# Patient Record
Sex: FEMALE | Race: WHITE | URBAN - METROPOLITAN AREA
[De-identification: names, ages, dates, MRNs, and addresses within clinical notes are randomized per-mention and may not be internally consistent; named-entity substitution may affect disease eponyms.]

---
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
## 2020-07-04 ENCOUNTER — HOSPITAL ENCOUNTER (OUTPATIENT)
Facility: MEDICAL CENTER | Age: 20
End: 2020-07-04
Attending: INTERNAL MEDICINE
Payer: COMMERCIAL

## 2020-07-04 ENCOUNTER — OFFICE VISIT (OUTPATIENT)
Dept: URGENT CARE | Facility: CLINIC | Age: 20
End: 2020-07-04
Payer: COMMERCIAL

## 2020-07-04 VITALS
TEMPERATURE: 98.1 F | DIASTOLIC BLOOD PRESSURE: 64 MMHG | OXYGEN SATURATION: 99 % | HEART RATE: 82 BPM | SYSTOLIC BLOOD PRESSURE: 114 MMHG | WEIGHT: 268 LBS | HEIGHT: 70 IN | BODY MASS INDEX: 38.37 KG/M2

## 2020-07-04 DIAGNOSIS — B34.9 VIRAL INFECTION: ICD-10-CM

## 2020-07-04 DIAGNOSIS — R53.83 FATIGUE, UNSPECIFIED TYPE: ICD-10-CM

## 2020-07-04 DIAGNOSIS — R51.9 ACUTE NONINTRACTABLE HEADACHE, UNSPECIFIED HEADACHE TYPE: ICD-10-CM

## 2020-07-04 LAB
AMBIGUOUS DTTM AMBI4: NORMAL
COVID ORDER STATUS COVID19: NORMAL

## 2020-07-04 PROCEDURE — U0003 INFECTIOUS AGENT DETECTION BY NUCLEIC ACID (DNA OR RNA); SEVERE ACUTE RESPIRATORY SYNDROME CORONAVIRUS 2 (SARS-COV-2) (CORONAVIRUS DISEASE [COVID-19]), AMPLIFIED PROBE TECHNIQUE, MAKING USE OF HIGH THROUGHPUT TECHNOLOGIES AS DESCRIBED BY CMS-2020-01-R: HCPCS

## 2020-07-04 PROCEDURE — 99204 OFFICE O/P NEW MOD 45 MIN: CPT | Performed by: INTERNAL MEDICINE

## 2020-07-04 ASSESSMENT — ENCOUNTER SYMPTOMS
HEADACHES: 1
FATIGUE: 1
MYALGIAS: 1
FEVER: 0
SORE THROAT: 0
COUGH: 0
VOMITING: 0
SHORTNESS OF BREATH: 0
ARTHRALGIAS: 1
SWOLLEN GLANDS: 0

## 2020-07-04 NOTE — PROGRESS NOTES
Subjective:     Rebecca Garland is a 20 y.o. female who presents for Fatigue (x3days, joint pain, muscle fatigue, headache)       Fatigue   This is a new problem. The current episode started in the past 7 days. Associated symptoms include arthralgias, fatigue, headaches and myalgias. Pertinent negatives include no coughing, fever, sore throat, swollen glands or vomiting.   History reviewed. No pertinent past medical history.  Past Surgical History:   Procedure Laterality Date   • DENTAL EXTRACTION(S)  4/2/2014    Performed by Mg Carter D.D.S. at [facility]   • GYN SURGERY  2003    vaginal repair due to fall on [place] bars   • TONSILLECTOMY AND ADENOIDECTOMY       Social History     Socioeconomic History   • Marital status: Single     Spouse name: Not on file   • Number of children: Not on file   • Years of education: Not on file   • Highest education level: Not on file   Occupational History   • Not on file   Social Needs   • Financial resource strain: Not on file   • Food insecurity     Worry: Not on file     Inability: Not on file   • Transportation needs     Medical: Not on file     Non-medical: Not on file   Tobacco Use   • Smoking status: Never Smoker   • Smokeless tobacco: Never Used   Substance and Sexual Activity   • Alcohol use: No   • Drug use: No   • Sexual activity: Never     Comment: started menses 11-12  yrs old   Lifestyle   • Physical activity     Days per week: Not on file     Minutes per session: Not on file   • Stress: Not on file   Relationships   • Social connections     Talks on phone: Not on file     Gets together: Not on file     Attends Zoroastrian service: Not on file     Active member of club or organization: Not on file     Attends meetings of clubs or organizations: Not on file     Relationship status: Not on file   • Intimate partner violence     Fear of current or ex partner: Not on file     Emotionally abused: Not on file     Physically abused: Not on file      "Forced sexual activity: Not on file   Other Topics Concern   • Behavioral problems Not Asked   • Interpersonal relationships Not Asked   • Sad or not enjoying activities Not Asked   • Suicidal thoughts Not Asked   • Poor school performance Not Asked   • Reading difficulties Not Asked   • Speech difficulties Not Asked   • Writing difficulties Not Asked   • Inadequate sleep Not Asked   • Excessive TV viewing Not Asked   • Excessive video game use Not Asked   • Inadequate exercise Not Asked   • Sports related Not Asked   • Poor diet Not Asked   • Family concerns for drug/alcohol abuse Not Asked   • Poor oral hygiene Not Asked   • Bike safety Not Asked   • Family concerns vehicle safety Not Asked   Social History Narrative   • Not on file    History reviewed. No pertinent family history. Review of Systems   Constitutional: Positive for fatigue. Negative for fever.   HENT: Negative for sore throat.    Respiratory: Negative for cough and shortness of breath.    Gastrointestinal: Negative for vomiting.   Musculoskeletal: Positive for arthralgias and myalgias.   Neurological: Positive for headaches.   All other systems reviewed and are negative.  No Known Allergies   Objective:   /64 (BP Location: Left arm, Patient Position: Sitting, BP Cuff Size: Large adult)   Pulse 82   Temp 36.7 °C (98.1 °F) (Temporal)   Ht 1.778 m (5' 10\")   Wt 121.6 kg (268 lb)   SpO2 99%   BMI 38.45 kg/m²   Physical Exam  Constitutional:       General: She is not in acute distress.     Appearance: She is well-developed.   HENT:      Head: Normocephalic and atraumatic.      Mouth/Throat:      Mouth: Mucous membranes are moist.      Pharynx: Oropharynx is clear.   Eyes:      Conjunctiva/sclera: Conjunctivae normal.   Neck:      Musculoskeletal: No neck rigidity.   Cardiovascular:      Rate and Rhythm: Normal rate and regular rhythm.   Pulmonary:      Effort: Pulmonary effort is normal. No respiratory distress.      Breath sounds: Normal " breath sounds.   Lymphadenopathy:      Cervical: No cervical adenopathy.   Skin:     General: Skin is warm and dry.      Capillary Refill: Capillary refill takes less than 2 seconds.   Neurological:      Mental Status: She is alert and oriented to person, place, and time.      Sensory: No sensory deficit.      Deep Tendon Reflexes: Reflexes are normal and symmetric.   Psychiatric:         Mood and Affect: Mood normal.         Behavior: Behavior normal.           Assessment/Plan:   Assessment    1. Viral infection  - COVID/SARS CoV-2 PCR; Future    2. Fatigue, unspecified type    3. Acute nonintractable headache, unspecified headache type    covid neg      Advised Tylenol gram 3 times a day, ibuprofen 600mg 3-4 times a day with food as needed, counseled on indications to go to the hospital, increase fluid intake  Follow-up with PCP if is not better in a week    Differential diagnosis, natural history, supportive care, and indications for immediate follow-up discussed.

## 2020-07-05 LAB
SARS-COV-2 RNA RESP QL NAA+PROBE: NOTDETECTED
SPECIMEN SOURCE: NORMAL

## 2020-07-23 ENCOUNTER — TELEMEDICINE (OUTPATIENT)
Dept: MEDICAL GROUP | Facility: LAB | Age: 20
End: 2020-07-23
Payer: COMMERCIAL

## 2020-07-23 VITALS — HEIGHT: 70 IN | WEIGHT: 260 LBS | TEMPERATURE: 98.2 F | BODY MASS INDEX: 37.22 KG/M2

## 2020-07-23 DIAGNOSIS — Z30.41 ENCOUNTER FOR SURVEILLANCE OF CONTRACEPTIVE PILLS: ICD-10-CM

## 2020-07-23 DIAGNOSIS — R79.89 ABNORMAL TSH: ICD-10-CM

## 2020-07-23 DIAGNOSIS — F41.9 ANXIETY: ICD-10-CM

## 2020-07-23 DIAGNOSIS — E66.9 OBESITY WITHOUT SERIOUS COMORBIDITY, UNSPECIFIED CLASSIFICATION, UNSPECIFIED OBESITY TYPE: ICD-10-CM

## 2020-07-23 DIAGNOSIS — F32.89 OTHER DEPRESSION: ICD-10-CM

## 2020-07-23 PROCEDURE — 99214 OFFICE O/P EST MOD 30 MIN: CPT | Mod: 95,CR | Performed by: NURSE PRACTITIONER

## 2020-07-23 RX ORDER — BUPROPION HYDROCHLORIDE 150 MG/1
150 TABLET ORAL EVERY MORNING
Qty: 30 TAB | Refills: 5 | Status: SHIPPED | OUTPATIENT
Start: 2020-07-23 | End: 2020-11-23 | Stop reason: SDUPTHER

## 2020-07-23 ASSESSMENT — PATIENT HEALTH QUESTIONNAIRE - PHQ9
SUM OF ALL RESPONSES TO PHQ9 QUESTIONS 1 AND 2: 3
3. TROUBLE FALLING OR STAYING ASLEEP OR SLEEPING TOO MUCH: NEARLY EVERY DAY
2. FEELING DOWN, DEPRESSED, IRRITABLE, OR HOPELESS: SEVERAL DAYS
5. POOR APPETITE OR OVEREATING: MORE THAN HALF THE DAYS
8. MOVING OR SPEAKING SO SLOWLY THAT OTHER PEOPLE COULD HAVE NOTICED. OR THE OPPOSITE, BEING SO FIGETY OR RESTLESS THAT YOU HAVE BEEN MOVING AROUND A LOT MORE THAN USUAL: NOT AT ALL
9. THOUGHTS THAT YOU WOULD BE BETTER OFF DEAD, OR OF HURTING YOURSELF: SEVERAL DAYS
7. TROUBLE CONCENTRATING ON THINGS, SUCH AS READING THE NEWSPAPER OR WATCHING TELEVISION: SEVERAL DAYS
SUM OF ALL RESPONSES TO PHQ QUESTIONS 1-9: 16
4. FEELING TIRED OR HAVING LITTLE ENERGY: NEARLY EVERY DAY
1. LITTLE INTEREST OR PLEASURE IN DOING THINGS: MORE THAN HALF THE DAYS
6. FEELING BAD ABOUT YOURSELF - OR THAT YOU ARE A FAILURE OR HAVE LET YOURSELF OR YOUR FAMILY DOWN: NEARLY EVERY DAY

## 2020-07-23 NOTE — PROGRESS NOTES
Telemedicine Visit: Established Patient     This encounter was conducted via Zoom .   Verbal consent was obtained. Patient's identity was verified.    Subjective:   CC:   Rebecca is a 20 y.o. female presenting for evaluation and management of:    #1-contraception: chronic issue.  Menses have been occurring regular and monthly. Taking generic gianvi.  Sexually active with one partner - denies std concerns.  Non-smoker.  No history of blood clots.    #2/3-depression with anxiety:  chronic issues.  Roller coaster lately, per pt but overall feeling well now.  Last mo:  Sad / low energy.  This month - feeling good.  Was rx celexa through [employer] - made her nauseated.  Taking wellbutrin 150 mg q am and propranolol prn.  Denies SI or HI.  No substance abuse.  Rare alcohol use.  Moving to [place] - moving to [employer] for [employer] major -looking forward to this.      #4- obesity:  Working out and eating healthier.  Has lost about 10 lbs in the past year.      ROS   Denies any recent fevers or chills. No nausea or vomiting. No chest pains or shortness of breath.     No Known Allergies    Current medicines (including changes today)  Current Outpatient Medications   Medication Sig Dispense Refill   • propranolol (INDERAL) 20 MG Tab TAKE ONE TABLET BY MOUTH TWICE A DAY AS NEEDED FOR ANXIETY (TAKE 1 TO 2 HOURS PRIOR TO EVENT) 30 Tab 3   • buPROPion (WELLBUTRIN XL) 150 MG XL tablet Take 1 Tab by mouth every morning. 30 Tab 5   • drospirenone-ethinyl estradiol (GIANVI) 3-0.02 MG per tablet Take 1 Tab by mouth every day. TAKE 1 TABLET BY MOUTH ONCE DAILY*GENERIC FOR ROSI 84 Tab 3   • citalopram (CELEXA) 20 MG Tab TAKE ONE TABLET BY MOUTH EVERY DAY 30 Tab 0     No current facility-administered medications for this visit.        Patient Active Problem List    Diagnosis Date Noted   • Abnormal TSH 08/01/2016   • Depression 06/21/2016   • Dental impaction 04/02/2014   • Obesity 01/07/2014   • Acne 01/07/2014   • Irregular menses 01/07/2014       History  "reviewed. No pertinent family history.    She  has no past medical history on file.  She  has a past surgical history that includes tonsillectomy and adenoidectomy; gyn surgery (2003); and dental extraction(s) (4/2/2014).       Objective:   Temp 36.8 °C (98.2 °F) (Temporal)   Ht 1.778 m (5' 10\")   Wt 117.9 kg (260 lb)   BMI 37.31 kg/m²     Physical Exam:  Constitutional: Alert, no distress, well-groomed.  Skin: No rashes in visible areas.  Eye: Round. Conjunctiva clear, lids normal. No icterus.   ENMT: Lips pink without lesions, good dentition, moist mucous membranes. Phonation normal.  Neck: No masses, no thyromegaly. Moves freely without pain.  CV: Pulse as reported by patient  Respiratory: Unlabored respiratory effort, no cough or audible wheeze  Psych: Alert and oriented x3, normal affect and mood.       Assessment and Plan:   The following treatment plan was discussed:   1. Anxiety  -Currently stable.  Did not do well on SSRI therapy.  I encouraged her to find a therapist when she moves to [place].  Recommend following up with me 1 to 2 months after getting to [place] if her moods are not continuing to improve/stabilize.  We discussed the importance of exercise.  - buPROPion (WELLBUTRIN XL) 150 MG XL tablet; Take 1 Tab by mouth every morning.  Dispense: 30 Tab; Refill: 5    2. Other depression  -Currently stabilized.  Encouraged her to increase Wellbutrin to 300 mg when she gets to [place] if her moods begin to decline.  As above, encouraged exercise and seeing a therapist.  Denies SI or HI.  - buPROPion (WELLBUTRIN XL) 150 MG XL tablet; Take 1 Tab by mouth every morning.  Dispense: 30 Tab; Refill: 5    3. Abnormal TSH  -She declines coming in for a physical or doing lab work at this time.  She is agreeable to coming to see me in the fall for her first Pap smear and updated labs.    4. Obesity without serious comorbidity, unspecified classification, unspecified obesity type  -She has lost almost 10 " pounds since our last visit.  I encouraged her to continue with exercise and portion control.    5. Encounter for surveillance of contraceptive pills  -Pt counseled on birth control pills. Risks, benefits and complications from hormone use reviewed. Failure rates discussed. Back up contraception and STD prevention discussed.        Follow-up: 3-4 mo for annual gyn exam / pap / labs / vaccines.

## 2020-08-04 ENCOUNTER — OFFICE VISIT (OUTPATIENT)
Dept: URGENT CARE | Facility: CLINIC | Age: 20
End: 2020-08-04
Payer: COMMERCIAL

## 2020-08-04 VITALS
BODY MASS INDEX: 37.05 KG/M2 | OXYGEN SATURATION: 98 % | WEIGHT: 258.8 LBS | RESPIRATION RATE: 16 BRPM | HEIGHT: 70 IN | HEART RATE: 76 BPM | SYSTOLIC BLOOD PRESSURE: 126 MMHG | DIASTOLIC BLOOD PRESSURE: 80 MMHG | TEMPERATURE: 98 F

## 2020-08-04 DIAGNOSIS — A08.4 VIRAL GASTROENTERITIS: ICD-10-CM

## 2020-08-04 PROCEDURE — 99214 OFFICE O/P EST MOD 30 MIN: CPT | Performed by: PHYSICIAN ASSISTANT

## 2020-08-04 RX ORDER — ONDANSETRON 4 MG/1
4 TABLET, FILM COATED ORAL EVERY 4 HOURS PRN
Qty: 20 TAB | Refills: 0 | Status: SHIPPED | OUTPATIENT
Start: 2020-08-04 | End: 2020-08-09

## 2020-08-04 ASSESSMENT — ENCOUNTER SYMPTOMS
NAUSEA: 1
CHANGE IN BOWEL HABIT: 1
NUMBER OF EPISODES OF EMESIS TODAY: 1
BLOOD IN STOOL: 0
FATIGUE: 0
VOMITING: 1
SORE THROAT: 0
COUGH: 0
SHORTNESS OF BREATH: 0
ABDOMINAL PAIN: 0
MYALGIAS: 1
DIARRHEA: 1
HEARTBURN: 0
CHILLS: 0
SWOLLEN GLANDS: 0
FEVER: 0
WHEEZING: 0
CARDIOVASCULAR NEGATIVE: 1
NEUROLOGICAL NEGATIVE: 1
CONSTIPATION: 0

## 2020-08-04 NOTE — PATIENT INSTRUCTIONS
Viral Gastroenteritis, Adult    Viral gastroenteritis is also known as the stomach flu. This condition may affect your stomach, small intestine, and large intestine. It can cause sudden watery diarrhea, fever, and vomiting. This condition is caused by many different viruses. These viruses can be passed from person to person very easily (are contagious).  Diarrhea and vomiting can make you feel weak and cause you to become dehydrated. You may not be able to keep fluids down. Dehydration can make you tired and thirsty, cause you to have a dry mouth, and decrease how often you urinate. It is important to replace the fluids that you lose from diarrhea and vomiting.  What are the causes?  Gastroenteritis is caused by many viruses, including rotavirus and norovirus. Norovirus is the most common cause in adults. You can get sick after being exposed to the viruses from other people. You can also get sick by:  · Eating food, drinking water, or touching a surface contaminated with one of these viruses.  · Sharing utensils or other personal items with an infected person.  What increases the risk?  You are more likely to develop this condition if you:  · Have a weak body defense system (immune system).  · Live with one or more children who are younger than 2 years old.  · Live in a nursing home.  · Travel on cruise ships.  What are the signs or symptoms?  Symptoms of this condition start suddenly 1-3 days after exposure to a virus. Symptoms may last for a few days or for as long as a week. Common symptoms include watery diarrhea and vomiting. Other symptoms include:  · Fever.  · Headache.  · Fatigue.  · Pain in the abdomen.  · Chills.  · Weakness.  · Nausea.  · Muscle aches.  · Loss of appetite.  How is this diagnosed?  This condition is diagnosed with a medical history and physical exam. You may also have a stool test to check for viruses or other infections.  How is this treated?  This condition typically goes away on its  own. The focus of treatment is to prevent dehydration and restore lost fluids (rehydration). This condition may be treated with:  · An oral rehydration solution (ORS) to replace important salts and minerals (electrolytes) in your body. Take this if told by your health care provider. This is a drink that is sold at pharmacies and retail stores.  · Medicines to help with your symptoms.  · Probiotic supplements to reduce symptoms of diarrhea.  · Fluids given through an IV, if dehydration is severe.  Older adults and people with other diseases or a weak immune system are at higher risk for dehydration.  Follow these instructions at home:    Eating and drinking    · Take an ORS as told by your health care provider.  · Drink clear fluids in small amounts as you are able. Clear fluids include:  ? Water.  ? Ice chips.  ? Diluted fruit juice.  ? Low-calorie sports drinks.  · Drink enough fluid to keep your urine pale yellow.  · Eat small amounts of healthy foods every 3-4 hours as you are able. This may include whole grains, fruits, vegetables, lean meats, and yogurt.  · Avoid fluids that contain a lot of sugar or caffeine, such as energy drinks, sports drinks, and soda.  · Avoid spicy or fatty foods.  · Avoid alcohol.  General instructions  · Wash your hands often, especially after having diarrhea or vomiting. If soap and water are not available, use hand .  · Make sure that all people in your household wash their hands well and often.  · Take over-the-counter and prescription medicines only as told by your health care provider.  · Rest at home while you recover.  · Watch your condition for any changes.  · Take a warm bath to relieve any burning or pain from frequent diarrhea episodes.  · Keep all follow-up visits as told by your health care provider. This is important.  Contact a health care provider if you:  · Cannot keep fluids down.  · Have symptoms that get worse.  · Have new symptoms.  · Feel light-headed or  dizzy.  · Have muscle cramps.  Get help right away if you:  · Have chest pain.  · Feel extremely weak or you faint.  · See blood in your vomit.  · Have vomit that looks like coffee grounds.  · Have bloody or black stools or stools that look like tar.  · Have a severe headache, a stiff neck, or both.  · Have a rash.  · Have severe pain, cramping, or bloating in your abdomen.  · Have trouble breathing or you are breathing very quickly.  · Have a fast heartbeat.  · Have skin that feels cold and clammy.  · Feel confused.  · Have pain when you urinate.  · Have signs of dehydration, such as:  ? Dark urine, very little urine, or no urine.  ? Cracked lips.  ? Dry mouth.  ? Sunken eyes.  ? Sleepiness.  ? Weakness.  Summary  · Viral gastroenteritis is also known as the stomach flu. It can cause sudden watery diarrhea, fever, and vomiting.  · This condition can be passed from person to person very easily (is contagious).  · Take an ORS if told by your health care provider. This is a drink that is sold at pharmacies and retail stores.  · Wash your hands often, especially after having diarrhea or vomiting. If soap and water are not available, use hand .  This information is not intended to replace advice given to you by your health care provider. Make sure you discuss any questions you have with your health care provider.  Document Released: 12/18/2006 Document Revised: 10/23/2019 Document Reviewed: 10/23/2019  [employer] Patient Education © 2020 [employer]

## 2020-08-04 NOTE — PROGRESS NOTES
Subjective:      Rebecca Garland is a 20 y.o. female who presents with Emesis (x24 hours, throwing up on and off, small nasal congestion, at first mucus coming up but now is throwing up all food. bodyaches)            Nausea, vomiting, diarrhea, generalized abdominal cramping since yesterday.  Denies cough, shortness of breath, fever.  No exposure to COVID-19.  No abdominal history.  Denies urinary symptoms.    Emesis   This is a new problem. The current episode started yesterday. The problem occurs constantly. The problem has been unchanged. Associated symptoms include a change in bowel habit, myalgias, nausea and vomiting. Pertinent negatives include no abdominal pain, chills, congestion, coughing, fatigue, fever, rash, sore throat, swollen glands or urinary symptoms. The symptoms are aggravated by eating and drinking. She has tried nothing for the symptoms. The treatment provided no relief.         PMH:  has no past medical history on file.  MEDS:   Current Outpatient Medications:   •  buPROPion (WELLBUTRIN XL) 150 MG XL tablet, Take 1 Tab by mouth every morning., Disp: 30 Tab, Rfl: 5  •  propranolol (INDERAL) 20 MG Tab, TAKE ONE TABLET BY MOUTH TWICE A DAY AS NEEDED FOR ANXIETY (TAKE 1 TO 2 HOURS PRIOR TO EVENT), Disp: 30 Tab, Rfl: 3  •  drospirenone-ethinyl estradiol (GIANVI) 3-0.02 MG per tablet, Take 1 Tab by mouth every day. TAKE 1 TABLET BY MOUTH ONCE DAILY*GENERIC FOR ROSI, Disp: 84 Tab, Rfl: 3  ALLERGIES: No Known Allergies  SURGHX:   Past Surgical History:   Procedure Laterality Date   • DENTAL EXTRACTION(S)  4/2/2014    Performed by Mg Carter D.D.S. at [facility]   • GYN SURGERY  2003    vaginal repair due to fall on monkeys bars   • TONSILLECTOMY AND ADENOIDECTOMY       SOCHX:  reports that she has never smoked. She has never used smokeless tobacco. She reports that she does not drink alcohol or use drugs.  FH: family history includes Arthritis in her mother; Heart Disease in her  "father.    Review of Systems   Constitutional: Negative for chills, fatigue and fever.   HENT: Negative for congestion, ear pain and sore throat.    Respiratory: Negative for cough, shortness of breath and wheezing.    Cardiovascular: Negative.    Gastrointestinal: Positive for change in bowel habit, diarrhea, nausea and vomiting. Negative for abdominal pain, blood in stool, constipation, heartburn and melena.   Genitourinary: Negative.    Musculoskeletal: Positive for myalgias. Negative for joint pain.   Skin: Negative for rash.   Neurological: Negative.        Medications, Allergies, and current problem list reviewed today in Epic     Objective:     /80   Pulse 76   Temp 36.7 °C (98 °F) (Temporal)   Resp 16   Ht 1.778 m (5' 10\")   Wt 117.4 kg (258 lb 12.8 oz)   SpO2 98%   BMI 37.13 kg/m²      Physical Exam  Vitals signs and nursing note reviewed.   Constitutional:       General: She is not in acute distress.     Appearance: She is well-developed. She is not diaphoretic.   HENT:      Head: Normocephalic and atraumatic.      Right Ear: Tympanic membrane and external ear normal.      Left Ear: Tympanic membrane and external ear normal.      Nose: Nose normal. No congestion or rhinorrhea.      Mouth/Throat:      Pharynx: No oropharyngeal exudate or posterior oropharyngeal erythema.   Eyes:      General:         Right eye: No discharge.         Left eye: No discharge.      Conjunctiva/sclera: Conjunctivae normal.   Neck:      Musculoskeletal: Normal range of motion and neck supple.   Cardiovascular:      Rate and Rhythm: Normal rate and regular rhythm.      Heart sounds: Normal heart sounds.   Pulmonary:      Effort: Pulmonary effort is normal. No respiratory distress.      Breath sounds: Normal breath sounds. No wheezing, rhonchi or rales.   Abdominal:      General: Abdomen is flat. There is no distension.      Palpations: Abdomen is soft.      Tenderness: There is generalized abdominal tenderness. There " is no right CVA tenderness, left CVA tenderness, guarding or rebound. Negative signs include Oden's sign and McBurney's sign.   Musculoskeletal: Normal range of motion.   Lymphadenopathy:      Cervical: No cervical adenopathy.   Skin:     General: Skin is warm and dry.   Neurological:      Mental Status: She is alert and oriented to person, place, and time.   Psychiatric:         Behavior: Behavior normal.         Thought Content: Thought content normal.         Judgment: Judgment normal.                 Assessment/Plan:     1. Viral gastroenteritis  ondansetron (ZOFRAN) 4 MG Tab tablet     Nausea, vomiting, diarrhea, generalized abdominal pain for the last 24 hours.  Unable to eat secondary to nausea.  Denies sharp abdominal pains or urinary symptoms.  Denies upper respiratory symptoms.  Denies cough, shortness of breath, fever.  No exposure to COVID-19.  Exam shows normal vital signs, PO2 98%, temperature normal.  Mild generalized abdominal tenderness.  No rebound guarding or McBurney's point tenderness at this time.  Symptoms consistent with a viral gastroenteritis.  If symptoms change return to clinic for further work-up and testing is needed.  OTC meds and conservative measures as discussed    Return to clinic or go to ED if symptoms worsen or persist. Indications for ED discussed at length. Patient voices understanding. Follow-up with your primary care provider in 3-5 days. Red flags discussed. All side effects of medication discussed including allergic response, GI upset, tendon injury, etc.    Please note that this dictation was created using voice recognition software. I have made every reasonable attempt to correct obvious errors, but I expect that there are errors of grammar and possibly content that I did not discover before finalizing the note.

## 2020-11-21 ENCOUNTER — PATIENT MESSAGE (OUTPATIENT)
Dept: MEDICAL GROUP | Facility: LAB | Age: 20
End: 2020-11-21

## 2020-11-21 DIAGNOSIS — F41.9 ANXIETY: ICD-10-CM

## 2020-11-21 DIAGNOSIS — F32.89 OTHER DEPRESSION: ICD-10-CM

## 2020-11-23 RX ORDER — BUPROPION HYDROCHLORIDE 300 MG/1
300 TABLET ORAL EVERY MORNING
Qty: 30 TAB | Refills: 5 | Status: SHIPPED | OUTPATIENT
Start: 2020-11-23 | End: 2021-03-16

## 2021-03-16 DIAGNOSIS — F41.9 ANXIETY: ICD-10-CM

## 2021-03-16 DIAGNOSIS — F32.89 OTHER DEPRESSION: ICD-10-CM

## 2021-03-16 RX ORDER — BUPROPION HYDROCHLORIDE 300 MG/1
TABLET ORAL
Qty: 90 TABLET | Refills: 1 | Status: SHIPPED | OUTPATIENT
Start: 2021-03-16 | End: 2021-09-20

## 2021-09-20 DIAGNOSIS — F41.9 ANXIETY: ICD-10-CM

## 2021-09-20 DIAGNOSIS — F32.89 OTHER DEPRESSION: ICD-10-CM

## 2021-09-20 RX ORDER — BUPROPION HYDROCHLORIDE 300 MG/1
TABLET ORAL
Qty: 90 TABLET | Refills: 1 | Status: SHIPPED | OUTPATIENT
Start: 2021-09-20 | End: 2022-03-24

## 2021-09-20 NOTE — TELEPHONE ENCOUNTER
Received request via: Pharmacy    Was the patient seen in the last year in this department? No   7/23/20  Does the patient have an active prescription (recently filled or refills available) for medication(s) requested? No

## 2022-03-24 DIAGNOSIS — F41.9 ANXIETY: ICD-10-CM

## 2022-03-24 DIAGNOSIS — F32.89 OTHER DEPRESSION: ICD-10-CM

## 2022-03-24 RX ORDER — BUPROPION HYDROCHLORIDE 300 MG/1
TABLET ORAL
Qty: 90 TABLET | Refills: 1 | Status: SHIPPED | OUTPATIENT
Start: 2022-03-24 | End: 2022-08-22

## 2022-03-24 NOTE — TELEPHONE ENCOUNTER
Received request via: Pharmacy  7/23/2020LOV  Was the patient seen in the last year in this department? No    Does the patient have an active prescription (recently filled or refills available) for medication(s) requested? No

## 2022-05-25 ENCOUNTER — TELEMEDICINE (OUTPATIENT)
Dept: MEDICAL GROUP | Facility: LAB | Age: 22
End: 2022-05-25
Payer: COMMERCIAL

## 2022-05-25 VITALS — BODY MASS INDEX: 35.07 KG/M2 | HEIGHT: 70 IN | WEIGHT: 245 LBS

## 2022-05-25 DIAGNOSIS — F41.9 ANXIETY: ICD-10-CM

## 2022-05-25 DIAGNOSIS — F43.10 PTSD (POST-TRAUMATIC STRESS DISORDER): ICD-10-CM

## 2022-05-25 DIAGNOSIS — R11.2 NAUSEA AND VOMITING, INTRACTABILITY OF VOMITING NOT SPECIFIED, UNSPECIFIED VOMITING TYPE: ICD-10-CM

## 2022-05-25 DIAGNOSIS — F33.1 MODERATE EPISODE OF RECURRENT MAJOR DEPRESSIVE DISORDER (HCC): ICD-10-CM

## 2022-05-25 PROCEDURE — 99214 OFFICE O/P EST MOD 30 MIN: CPT | Mod: 95 | Performed by: NURSE PRACTITIONER

## 2022-05-25 RX ORDER — PROPRANOLOL HYDROCHLORIDE 20 MG/1
20 TABLET ORAL 3 TIMES DAILY PRN
Qty: 90 TABLET | Refills: 1 | Status: SHIPPED | OUTPATIENT
Start: 2022-05-25 | End: 2022-06-20

## 2022-05-25 RX ORDER — NORGESTIMATE AND ETHINYL ESTRADIOL 7DAYSX3 28
KIT ORAL
COMMUNITY
Start: 2022-05-06

## 2022-05-25 RX ORDER — OMEPRAZOLE 20 MG/1
20 CAPSULE, DELAYED RELEASE ORAL DAILY
Qty: 30 CAPSULE | Refills: 1 | Status: SHIPPED | OUTPATIENT
Start: 2022-05-25 | End: 2023-11-05 | Stop reason: SDUPTHER

## 2022-05-25 ASSESSMENT — PATIENT HEALTH QUESTIONNAIRE - PHQ9
CLINICAL INTERPRETATION OF PHQ2 SCORE: 4
5. POOR APPETITE OR OVEREATING: 2 - MORE THAN HALF THE DAYS
SUM OF ALL RESPONSES TO PHQ QUESTIONS 1-9: 13

## 2022-05-25 NOTE — PROGRESS NOTES
Virtual Visit: Established Patient   This visit was conducted via Zoom using secure and encrypted videoconferencing technology.   The patient was in their home in the [facility].    The patient's identity was confirmed and verbal consent was obtained for this virtual visit.    Subjective:   CC:   Chief Complaint   Patient presents with   • Medication Management   • Anxiety     Rebecca Garland is a 22 y.o. female presenting for evaluation and management of:    Depression: Chronic issue for the patient.  Interested in medication changes.  Doesn't feel that bupropion is working well.  Struggling to motivate / leave apt.  Feels depression is worsening b/c of trauma at [facility] / ptsd which she is reminded of being back in person at [facility].  Saw a therapist when she was at [facility] a few years.  Also has anxiety a few times a week.   Has propranolol which helps but doesn't last long enough.  Denies SI - states that she wouldn't hurt herself b/c of her mom / cat.     N/v:  Present since moving to [facility] 2020.  Dramamine helps periodically.  Occurring a few days per week.  Doesn't always vomit.  Avoids nsaids / spicy / fried foods.  Has one red bull per day but cut out coffee.  No other associated symptoms.  Has a family history of eosinophilic esophagitis.  Denies bowel problems.      Current medicines (including changes today)  Current Outpatient Medications   Medication Sig Dispense Refill   • buPROPion (WELLBUTRIN XL) 300 MG XL tablet TAKE 1 TABLET BY MOUTH EVERY DAY IN THE MORNING 90 Tablet 1   • propranolol (INDERAL) 20 MG Tab TAKE ONE TABLET BY MOUTH TWICE A DAY AS NEEDED FOR ANXIETY (TAKE 1 TO 2 HOURS PRIOR TO EVENT) 30 Tab 3   • drospirenone-ethinyl estradiol (GIANVI) 3-0.02 MG per tablet Take 1 Tab by mouth every day. TAKE 1 TABLET BY MOUTH ONCE DAILY*GENERIC FOR ROSI 84 Tab 3     No current facility-administered medications for this visit.       Patient Active Problem List    Diagnosis Date Noted   • Anxiety 07/23/2020   •  "Abnormal TSH 08/01/2016   • Depression 06/21/2016   • Obesity 01/07/2014   • Acne 01/07/2014        Objective:   Ht 1.778 m (5' 10\")   Wt 111 kg (245 lb)   BMI 35.15 kg/m²     Physical Exam:Gen: NAD  Resp: nonlabored.  Able to speak in full sentences  Psy: pleasant / cooperative.   Neuro:  Alert and oriented x 3      Assessment and Plan:   The following treatment plan was discussed:   \"  1. PTSD (post-traumatic stress disorder)  Referral to Behavioral Health    sertraline (ZOLOFT) 50 MG Tab   2. Moderate episode of recurrent major depressive disorder (HCC)  Referral to Behavioral Health    sertraline (ZOLOFT) 50 MG Tab   3. Anxiety  sertraline (ZOLOFT) 50 MG Tab    propranolol (INDERAL) 20 MG Tab   4. Nausea and vomiting, intractability of vomiting not specified, unspecified vomiting type  omeprazole (PRILOSEC) 20 MG delayed-release capsule   \"           Encouraged her to continue 300 mg XL bupropion and add on 50 mg of sertraline once daily.  Discussed potential side effects as well as length of onset efficacy of sertraline.  Renewed propanolol for her to use up to 3 times a day.  Referred to psychologist for therapy/treatment and discussed the importance of this.  Encouraged her to exercise every day and reach out to friends/family for support as well.  Fortunately she tells me that she would not kill herself.  I will meet back together with her in 4 weeks to see how she is doing with the addition of sertraline and to find out if she has a scheduled appointment with psychology yet.    We discussed that the nausea/vomiting could be related to her anxiety and depression in terms of increased acid production/gastritis.  Trial of omeprazole 20 mg every morning prior to breakfast.  Fortunately she does not enjoy a lot of spicy, fried foods or ibuprofen and only has 1 caffeinated beverage per day.  She will certainly notify me over the next few weeks if any symptoms worsen otherwise we will meet back together in 1 " month and consider referral to GI if symptoms are not improving.

## 2022-06-20 DIAGNOSIS — F41.9 ANXIETY: ICD-10-CM

## 2022-06-20 DIAGNOSIS — F33.1 MODERATE EPISODE OF RECURRENT MAJOR DEPRESSIVE DISORDER (HCC): ICD-10-CM

## 2022-06-20 DIAGNOSIS — F43.10 PTSD (POST-TRAUMATIC STRESS DISORDER): ICD-10-CM

## 2022-06-20 RX ORDER — PROPRANOLOL HYDROCHLORIDE 20 MG/1
20 TABLET ORAL 3 TIMES DAILY PRN
Qty: 90 TABLET | Refills: 1 | Status: SHIPPED | OUTPATIENT
Start: 2022-06-20 | End: 2022-07-19

## 2022-06-27 ENCOUNTER — TELEMEDICINE (OUTPATIENT)
Dept: MEDICAL GROUP | Facility: LAB | Age: 22
End: 2022-06-27
Payer: COMMERCIAL

## 2022-06-27 VITALS — BODY MASS INDEX: 35.07 KG/M2 | HEIGHT: 70 IN | WEIGHT: 245 LBS

## 2022-06-27 DIAGNOSIS — F41.9 ANXIETY: ICD-10-CM

## 2022-06-27 DIAGNOSIS — F33.1 MODERATE EPISODE OF RECURRENT MAJOR DEPRESSIVE DISORDER (HCC): ICD-10-CM

## 2022-06-27 DIAGNOSIS — F43.10 PTSD (POST-TRAUMATIC STRESS DISORDER): ICD-10-CM

## 2022-06-27 PROCEDURE — 99213 OFFICE O/P EST LOW 20 MIN: CPT | Mod: 95 | Performed by: NURSE PRACTITIONER

## 2022-06-27 RX ORDER — SERTRALINE HYDROCHLORIDE 100 MG/1
100 TABLET, FILM COATED ORAL DAILY
Qty: 30 TABLET | Refills: 5 | Status: SHIPPED | OUTPATIENT
Start: 2022-06-27 | End: 2022-07-19

## 2022-06-27 NOTE — PROGRESS NOTES
"Virtual Visit: Established Patient   This visit was conducted via Zoom using secure and encrypted videoconferencing technology.   The patient was in their home in the [facility].    The patient's identity was confirmed and verbal consent was obtained for this virtual visit.    Subjective:   CC:   Chief Complaint   Patient presents with   • Medication Management     Rebecca Garland is a 22 y.o. female presenting for evaluation and management of:    #1-anxiety with depression: We are following up on this today in relationship to our visit 1 month ago in which sertraline was added onto Wellbutrin.  She is not sure that sertraline really has done anything to improve her moods, stating she does not feel any different than she did a month ago.  Omeprazole has prevented vomiting x the past month.   New job - feels nausea was anxiety induced.   Anxiety linked to school and it's lower right now out of school.   Working in a low stress environment.   Sleep:  \"weird,\" sleeps for a few hours, then wakes up for an hour before she can return to sleep.    Motivation: on the lower end.    Exercise:  \"a little bit.\"   Sadness:  Dealing with this more than half of the week.  Denies SI or HI.  Does feel that propanolol is helpful to lower her anxiety.    Current medicines (including changes today)  Current Outpatient Medications   Medication Sig Dispense Refill   • sertraline (ZOLOFT) 50 MG Tab TAKE 1 TABLET BY MOUTH EVERY DAY 90 Tablet 4   • propranolol (INDERAL) 20 MG Tab TAKE 1 TABLET BY MOUTH 3 TIMES A DAY AS NEEDED (ANXIETY). 90 Tablet 1   • TRI-ESTARYLLA 0.18/0.215/0.25 MG-35 MCG Tab      • omeprazole (PRILOSEC) 20 MG delayed-release capsule Take 1 Capsule by mouth every day. Before breakfast 30 Capsule 1   • buPROPion (WELLBUTRIN XL) 300 MG XL tablet TAKE 1 TABLET BY MOUTH EVERY DAY IN THE MORNING 90 Tablet 1     No current facility-administered medications for this visit.       Patient Active Problem List    Diagnosis Date " "Noted   • Anxiety 07/23/2020   • Abnormal TSH 08/01/2016   • Depression 06/21/2016   • Obesity 01/07/2014   • Acne 01/07/2014        Objective:   Ht 1.778 m (5' 10\")   Wt 111 kg (245 lb)   BMI 35.15 kg/m²     Physical Exam:  Gen. appears healthy in no distress   Skin appropriate for sex and age   Neck trachea is midline  Lungs unlabored breathing  Heart regular rate  Neuro gait and station normal   Psych appropriate, calm, interactive, well-groomed    Assessment and Plan:   The following treatment plan was discussed:     \"  1. PTSD (post-traumatic stress disorder)  sertraline (ZOLOFT) 100 MG Tab   2. Moderate episode of recurrent major depressive disorder (HCC)  sertraline (ZOLOFT) 100 MG Tab   3. Anxiety  sertraline (ZOLOFT) 100 MG Tab   \"  We mutually decided to increase her sertraline to 100 mg and meet back together in 6 weeks.  Fortunately she responded well to omeprazole and propanolol.  May discontinue omeprazole at any point if she feels that her stomach has healed and anxiety at work has lessened.  Again fortunately denies SI or HI.  Encouraged her to work harder to motivate to exercise at least 4-5 days a week for 20 to 30 minutes which would help with anxiety, depression and sleep.  Encouraged her to limit caffeine and alcohol.  We will meet back together in 6 weeks.         "

## 2022-07-19 DIAGNOSIS — F33.1 MODERATE EPISODE OF RECURRENT MAJOR DEPRESSIVE DISORDER (HCC): ICD-10-CM

## 2022-07-19 DIAGNOSIS — F41.9 ANXIETY: ICD-10-CM

## 2022-07-19 DIAGNOSIS — F43.10 PTSD (POST-TRAUMATIC STRESS DISORDER): ICD-10-CM

## 2022-07-19 RX ORDER — PROPRANOLOL HYDROCHLORIDE 20 MG/1
20 TABLET ORAL 3 TIMES DAILY PRN
Qty: 90 TABLET | Refills: 1 | Status: SHIPPED | OUTPATIENT
Start: 2022-07-19 | End: 2022-08-23

## 2022-07-19 RX ORDER — SERTRALINE HYDROCHLORIDE 100 MG/1
100 TABLET, FILM COATED ORAL DAILY
Qty: 90 TABLET | Refills: 2 | Status: SHIPPED | OUTPATIENT
Start: 2022-07-19 | End: 2022-08-25 | Stop reason: SDUPTHER

## 2022-08-15 ENCOUNTER — APPOINTMENT (OUTPATIENT)
Dept: MEDICAL GROUP | Facility: LAB | Age: 22
End: 2022-08-15
Payer: COMMERCIAL

## 2022-08-21 DIAGNOSIS — F32.89 OTHER DEPRESSION: ICD-10-CM

## 2022-08-21 DIAGNOSIS — F41.9 ANXIETY: ICD-10-CM

## 2022-08-22 RX ORDER — BUPROPION HYDROCHLORIDE 300 MG/1
TABLET ORAL
Qty: 90 TABLET | Refills: 1 | Status: SHIPPED | OUTPATIENT
Start: 2022-08-22 | End: 2023-11-05 | Stop reason: SDUPTHER

## 2022-08-23 DIAGNOSIS — F41.9 ANXIETY: ICD-10-CM

## 2022-08-23 RX ORDER — PROPRANOLOL HYDROCHLORIDE 20 MG/1
20 TABLET ORAL 3 TIMES DAILY PRN
Qty: 90 TABLET | Refills: 1 | Status: SHIPPED | OUTPATIENT
Start: 2022-08-23 | End: 2023-11-05 | Stop reason: SDUPTHER

## 2022-08-23 NOTE — TELEPHONE ENCOUNTER
Received request via: Pharmacy    Was the patient seen in the last year in this department? Yes  6/27/2022  Does the patient have an active prescription (recently filled or refills available) for medication(s) requested? No

## 2022-08-25 ENCOUNTER — TELEMEDICINE (OUTPATIENT)
Dept: MEDICAL GROUP | Facility: LAB | Age: 22
End: 2022-08-25
Payer: COMMERCIAL

## 2022-08-25 VITALS — BODY MASS INDEX: 33.64 KG/M2 | HEIGHT: 70 IN | WEIGHT: 235 LBS

## 2022-08-25 DIAGNOSIS — F33.1 MODERATE EPISODE OF RECURRENT MAJOR DEPRESSIVE DISORDER (HCC): ICD-10-CM

## 2022-08-25 DIAGNOSIS — F41.9 ANXIETY: ICD-10-CM

## 2022-08-25 DIAGNOSIS — F43.10 PTSD (POST-TRAUMATIC STRESS DISORDER): ICD-10-CM

## 2022-08-25 PROCEDURE — 99213 OFFICE O/P EST LOW 20 MIN: CPT | Mod: 95 | Performed by: NURSE PRACTITIONER

## 2022-08-25 RX ORDER — SERTRALINE HYDROCHLORIDE 100 MG/1
150 TABLET, FILM COATED ORAL DAILY
Qty: 135 TABLET | Refills: 2 | Status: SHIPPED | OUTPATIENT
Start: 2022-08-25 | End: 2023-06-29

## 2022-08-25 NOTE — PROGRESS NOTES
"Virtual Visit: Established Patient   This visit was conducted via Zoom using secure and encrypted videoconferencing technology.   The patient was in their home in the [facility].    The patient's identity was confirmed and verbal consent was obtained for this virtual visit.    Subjective:   CC:   Chief Complaint   Patient presents with    Medication Management     Rebecca Garland is a 22 y.o. female presenting for evaluation and management of:    PTSD, MDD, MADDISON - chronic issues. increasing sertraline to 100 mg at the end of June has decreased anxiety level on day to day basis.  Sleeping fairly well.  Dealing with depression a few days per week - \"tiring / exhausted all the time / low motivation.\"  Not easily tearful.  Avoiding social situations.  Very sad a few x per week.  Denies SI or HI.  Taking sertraline in the morning.    Continues on wellbutrin  mg every morning.   Starting school next week - [employer].     Current medicines (including changes today)  Current Outpatient Medications   Medication Sig Dispense Refill    propranolol (INDERAL) 20 MG Tab TAKE 1 TABLET BY MOUTH 3 TIMES A DAY AS NEEDED (ANXIETY). 90 Tablet 1    buPROPion (WELLBUTRIN XL) 300 MG XL tablet TAKE 1 TABLET BY MOUTH EVERY DAY IN THE MORNING 90 Tablet 1    sertraline (ZOLOFT) 100 MG Tab TAKE 1 TABLET BY MOUTH EVERY DAY 90 Tablet 2    TRI-ESTARYLLA 0.18/0.215/0.25 MG-35 MCG Tab       omeprazole (PRILOSEC) 20 MG delayed-release capsule Take 1 Capsule by mouth every day. Before breakfast 30 Capsule 1     No current facility-administered medications for this visit.       Patient Active Problem List    Diagnosis Date Noted    Anxiety 07/23/2020    Abnormal TSH 08/01/2016    Depression 06/21/2016    Obesity 01/07/2014    Acne 01/07/2014        Objective:   Ht 1.778 m (5' 10\")   Wt 107 kg (235 lb)   BMI 33.72 kg/m²     Physical Exam:  Gen: NAD  Resp: nonlabored.  Able to speak in full sentences  Psy: pleasant / cooperative.   Neuro:  Alert " "and oriented x 3     Assessment and Plan:   The following treatment plan was discussed:   \"  1. PTSD (post-traumatic stress disorder)  sertraline (ZOLOFT) 100 MG Tab      2. Moderate episode of recurrent major depressive disorder (HCC)  sertraline (ZOLOFT) 100 MG Tab      3. Anxiety  sertraline (ZOLOFT) 100 MG Tab      \"  Recommend a trial of going up to 150 mg of sertraline to help decrease the days per week in which she is feeling depressed and anxious.  Discussed potential side effects and benefit of increasing sertraline.  Continue Wellbutrin.  She prefers to email me in a few weeks to let me know how she is doing on increased dosage of sertraline.  Again denies SI or HI.  Encouraged her to present to her closest behavioral health facility in [place] if she begins to have any suicidal ideations.  May also present to the emergency department with suicidal ideations.         "

## 2023-06-29 DIAGNOSIS — F33.1 MODERATE EPISODE OF RECURRENT MAJOR DEPRESSIVE DISORDER (HCC): ICD-10-CM

## 2023-06-29 DIAGNOSIS — F41.9 ANXIETY: ICD-10-CM

## 2023-06-29 DIAGNOSIS — F43.10 PTSD (POST-TRAUMATIC STRESS DISORDER): ICD-10-CM

## 2023-06-29 RX ORDER — SERTRALINE HYDROCHLORIDE 100 MG/1
TABLET, FILM COATED ORAL
Qty: 90 TABLET | Refills: 2 | Status: SHIPPED | OUTPATIENT
Start: 2023-06-29 | End: 2023-11-05 | Stop reason: SDUPTHER

## 2023-06-29 NOTE — TELEPHONE ENCOUNTER
Received request via: Pharmacy    Was the patient seen in the last year in this department? Yes  LOV 08/25/2022 - Telemedicine  Does the patient have an active prescription (recently filled or refills available) for medication(s) requested? No    Does the patient have long-term Plus and need 100 day supply (blood pressure, diabetes and cholesterol meds only)? Medication is not for cholesterol, blood pressure or diabetes and Patient does not have SCP

## 2023-11-05 DIAGNOSIS — F33.1 MODERATE EPISODE OF RECURRENT MAJOR DEPRESSIVE DISORDER (HCC): ICD-10-CM

## 2023-11-05 DIAGNOSIS — R11.2 NAUSEA AND VOMITING: ICD-10-CM

## 2023-11-05 DIAGNOSIS — F32.89 OTHER DEPRESSION: ICD-10-CM

## 2023-11-05 DIAGNOSIS — F43.10 PTSD (POST-TRAUMATIC STRESS DISORDER): ICD-10-CM

## 2023-11-05 DIAGNOSIS — F41.9 ANXIETY: ICD-10-CM

## 2023-11-06 RX ORDER — SERTRALINE HYDROCHLORIDE 100 MG/1
100 TABLET, FILM COATED ORAL
Qty: 90 TABLET | Refills: 0 | Status: SHIPPED | OUTPATIENT
Start: 2023-11-06 | End: 2023-11-08 | Stop reason: SDUPTHER

## 2023-11-06 RX ORDER — PROPRANOLOL HYDROCHLORIDE 20 MG/1
20 TABLET ORAL 3 TIMES DAILY PRN
Qty: 90 TABLET | Refills: 0 | Status: SHIPPED | OUTPATIENT
Start: 2023-11-06 | End: 2023-12-02

## 2023-11-06 RX ORDER — BUPROPION HYDROCHLORIDE 300 MG/1
300 TABLET ORAL EVERY MORNING
Qty: 90 TABLET | Refills: 0 | Status: SHIPPED | OUTPATIENT
Start: 2023-11-06 | End: 2023-11-08 | Stop reason: SDUPTHER

## 2023-11-06 RX ORDER — OMEPRAZOLE 20 MG/1
20 CAPSULE, DELAYED RELEASE ORAL DAILY
Qty: 90 CAPSULE | Refills: 0 | Status: SHIPPED | OUTPATIENT
Start: 2023-11-06 | End: 2024-02-05

## 2023-11-08 ENCOUNTER — TELEMEDICINE (OUTPATIENT)
Dept: MEDICAL GROUP | Facility: LAB | Age: 23
End: 2023-11-08
Payer: COMMERCIAL

## 2023-11-08 DIAGNOSIS — F43.10 PTSD (POST-TRAUMATIC STRESS DISORDER): ICD-10-CM

## 2023-11-08 DIAGNOSIS — N92.6 IRREGULAR MENSES: ICD-10-CM

## 2023-11-08 DIAGNOSIS — F32.89 OTHER DEPRESSION: ICD-10-CM

## 2023-11-08 DIAGNOSIS — F41.9 ANXIETY: ICD-10-CM

## 2023-11-08 PROCEDURE — 99214 OFFICE O/P EST MOD 30 MIN: CPT | Mod: 95 | Performed by: NURSE PRACTITIONER

## 2023-11-08 RX ORDER — SERTRALINE HYDROCHLORIDE 100 MG/1
150 TABLET, FILM COATED ORAL
Qty: 135 TABLET | Refills: 3 | Status: SHIPPED | OUTPATIENT
Start: 2023-11-08

## 2023-11-08 RX ORDER — BUPROPION HYDROCHLORIDE 300 MG/1
300 TABLET ORAL EVERY MORNING
Qty: 90 TABLET | Refills: 3 | Status: SHIPPED | OUTPATIENT
Start: 2023-11-08

## 2023-11-08 RX ORDER — DROSPIRENONE AND ETHINYL ESTRADIOL 0.02-3(28)
1 KIT ORAL DAILY
Qty: 84 TABLET | Refills: 3 | Status: SHIPPED | OUTPATIENT
Start: 2023-11-08

## 2023-11-08 NOTE — PROGRESS NOTES
"Virtual Visit: Established Patient   This visit was conducted via Zoom using secure and encrypted videoconferencing technology.   The patient was in their home in the state of [place].    The patient's identity was confirmed and verbal consent was obtained for this virtual visit.    Subjective:   CC:   F/u    HPI:  Rebecca is a 23 y.o. female presenting for evaluation and management of:    Anxiety with depression:  chronic issue.  stopped meds (3-4 months) \"awhile\" ago b/c she wasn't sure they were helping but went back on both a few d ago as being off of her medication made her realize that they were helpful.  on 150 mg sertraline and 300 mg wellbutrin again for the past few days. Denies SI or HI.  Excited that this is her last year at [facility].    Contraception:  used an online program for awhile and needs a rx for birth control.  Was previously on a triphasic ocp.  Struggles with adult acne. LMP one month ago.  Not currently sexually active.  Menses are irregular and she tells me that she usually uses a birth control pill to regulate her menses.  She has never had a blood clot and does not smoke cigarettes.      Current medicines (including changes today)  Current Outpatient Medications   Medication Sig Dispense Refill    omeprazole (PRILOSEC) 20 MG delayed-release capsule Take 1 Capsule by mouth every day. Before breakfast 90 Capsule 0    buPROPion (WELLBUTRIN XL) 300 MG XL tablet Take 1 Tablet by mouth every morning. 90 Tablet 0    propranolol (INDERAL) 20 MG Tab Take 1 Tablet by mouth 3 times a day as needed (anxiety). 90 Tablet 0    sertraline (ZOLOFT) 100 MG Tab Take 1 Tablet by mouth every day. 90 Tablet 0    TRI-ESTARYLLA 0.18/0.215/0.25 MG-35 MCG Tab        No current facility-administered medications for this visit.       Patient Active Problem List    Diagnosis Date Noted    Anxiety 07/23/2020    Abnormal TSH 08/01/2016    Depression 06/21/2016    Obesity 01/07/2014    Acne 01/07/2014        Objective: " "  There were no vitals taken for this visit.    Physical Exam:  Gen: NAD  Resp: nonlabored.  Able to speak in full sentences  Psy: pleasant / cooperative.   Neuro:  Alert and oriented x 3    Assessment and Plan:   The following treatment plan was discussed:   \"  1. Irregular menses  drospirenone-ethinyl estradiol (GIANVI) 3-0.02 MG per tablet      2. Anxiety  buPROPion (WELLBUTRIN XL) 300 MG XL tablet    sertraline (ZOLOFT) 100 MG Tab      3. Other depression  buPROPion (WELLBUTRIN XL) 300 MG XL tablet      4. PTSD (post-traumatic stress disorder)  sertraline (ZOLOFT) 100 MG Tab      \"  Contraception - chronic and not controlled / off a ocp and wants more help with acne.  Pt counseled on birth control pills. Risks, benefits and complications from hormone use reviewed. Failure rates discussed. Back up contraception and STD prevention discussed.  She is due for a Pap smear and she will make an appointment with me when she is home over [place] to have this done.  If she is unable to get into our office over [place] I encouraged her to visit [facility] or even student health at [facility] if they will do a Pap smear for her.  Discussed the importance of having a Pap smear to detect cervical cancer or precancerous cells.    MDD / MADDISON:  chronic issue and not controlled.  In terms of her moods, we discussed the importance of staying on Wellbutrin and sertraline to control her anxiety and depression.  Discussed length of onset efficacy when restarting SSRI therapy and dopamine agonist.  I encouraged her to contact me if symptoms are not improving or if she is having difficulty with either medication such as Wellbutrin or sertraline.  Again she denies SI or HI.  I encouraged her to seek out behavioral health emergency care if she begins to have any suicidal ideations.    I would like for her to follow-up with me either in person or via Zoom, Lana is also a choice within the next 3 to 4 weeks regarding how she is " feeling back on Wellbutrin, sertraline and on her new birth control pill.  She may certainly do her follow-up in 6-8 weeks when she is home around [place] if she is doing well.

## 2023-12-01 DIAGNOSIS — F41.9 ANXIETY: ICD-10-CM

## 2023-12-02 RX ORDER — PROPRANOLOL HYDROCHLORIDE 20 MG/1
20 TABLET ORAL 3 TIMES DAILY PRN
Qty: 270 TABLET | Refills: 1 | Status: SHIPPED | OUTPATIENT
Start: 2023-12-02

## 2024-02-04 DIAGNOSIS — R11.2 NAUSEA AND VOMITING: ICD-10-CM

## 2024-02-05 RX ORDER — OMEPRAZOLE 20 MG/1
20 CAPSULE, DELAYED RELEASE ORAL DAILY
Qty: 90 CAPSULE | Refills: 0 | Status: SHIPPED | OUTPATIENT
Start: 2024-02-05

## 2024-02-05 NOTE — TELEPHONE ENCOUNTER
Received request via: Pharmacy    Was the patient seen in the last year in this department? Yes  LOV : 11/8/2023 - TELEMEDICINE   Does the patient have an active prescription (recently filled or refills available) for medication(s) requested? No    Pharm[facility] Name: [facility]     Does the patient have correction Plus and need 100 day supply (blood pressure, diabetes and cholesterol meds only)? Patient does not have SCP

## 2024-07-08 DIAGNOSIS — R11.2 NAUSEA AND VOMITING: ICD-10-CM

## 2024-07-08 RX ORDER — OMEPRAZOLE 20 MG/1
20 CAPSULE, DELAYED RELEASE ORAL DAILY
Qty: 90 CAPSULE | Refills: 0 | Status: SHIPPED | OUTPATIENT
Start: 2024-07-08

## 2024-07-18 DIAGNOSIS — F43.10 PTSD (POST-TRAUMATIC STRESS DISORDER): ICD-10-CM

## 2024-07-18 DIAGNOSIS — F41.9 ANXIETY: ICD-10-CM

## 2024-07-18 RX ORDER — SERTRALINE HYDROCHLORIDE 100 MG/1
100 TABLET, FILM COATED ORAL
Qty: 90 TABLET | Refills: 0 | Status: SHIPPED | OUTPATIENT
Start: 2024-07-18

## 2024-09-17 ENCOUNTER — APPOINTMENT (OUTPATIENT)
Dept: MEDICAL GROUP | Facility: LAB | Age: 24
End: 2024-09-17
Payer: COMMERCIAL

## 2024-10-16 DIAGNOSIS — F43.10 PTSD (POST-TRAUMATIC STRESS DISORDER): ICD-10-CM

## 2024-10-16 DIAGNOSIS — F41.9 ANXIETY: ICD-10-CM

## 2024-10-16 RX ORDER — SERTRALINE HYDROCHLORIDE 100 MG/1
100 TABLET, FILM COATED ORAL
Qty: 90 TABLET | Refills: 0 | Status: SHIPPED | OUTPATIENT
Start: 2024-10-16 | End: 2024-10-30

## 2024-10-30 ENCOUNTER — APPOINTMENT (OUTPATIENT)
Dept: MEDICAL GROUP | Facility: LAB | Age: 24
End: 2024-10-30
Payer: COMMERCIAL

## 2024-10-30 VITALS
BODY MASS INDEX: 41.95 KG/M2 | TEMPERATURE: 97.7 F | DIASTOLIC BLOOD PRESSURE: 84 MMHG | HEIGHT: 70 IN | HEART RATE: 88 BPM | SYSTOLIC BLOOD PRESSURE: 130 MMHG | RESPIRATION RATE: 16 BRPM | WEIGHT: 293 LBS | OXYGEN SATURATION: 97 %

## 2024-10-30 DIAGNOSIS — N92.6 IRREGULAR MENSES: ICD-10-CM

## 2024-10-30 DIAGNOSIS — F41.8 ANXIETY WITH DEPRESSION: ICD-10-CM

## 2024-10-30 DIAGNOSIS — Z23 NEED FOR INFLUENZA VACCINATION: ICD-10-CM

## 2024-10-30 DIAGNOSIS — K21.9 GASTROESOPHAGEAL REFLUX DISEASE, UNSPECIFIED WHETHER ESOPHAGITIS PRESENT: ICD-10-CM

## 2024-10-30 DIAGNOSIS — Z00.00 PREVENTATIVE HEALTH CARE: ICD-10-CM

## 2024-10-30 RX ORDER — DROSPIRENONE AND ETHINYL ESTRADIOL 0.02-3(28)
1 KIT ORAL DAILY
Qty: 84 TABLET | Refills: 3 | Status: SHIPPED | OUTPATIENT
Start: 2024-10-30

## 2024-10-30 RX ORDER — BUPROPION HYDROCHLORIDE 150 MG/1
150 TABLET ORAL EVERY MORNING
Qty: 30 TABLET | Refills: 1 | Status: SHIPPED | OUTPATIENT
Start: 2024-10-30

## 2024-10-30 RX ORDER — ESCITALOPRAM OXALATE 10 MG/1
10 TABLET ORAL DAILY
Qty: 30 TABLET | Refills: 2 | Status: SHIPPED | OUTPATIENT
Start: 2024-10-30

## 2024-10-30 ASSESSMENT — PATIENT HEALTH QUESTIONNAIRE - PHQ9: CLINICAL INTERPRETATION OF PHQ2 SCORE: 0

## 2024-11-29 NOTE — TELEPHONE ENCOUNTER
Received request via: Pharmacy    Was the patient seen in the last year in this department? Yes  LOV : 10/30/2024   Does the patient have an active prescription (recently filled or refills available) for medication(s) requested? No    [facility]    Does the patient have [facility] Plus and need 100-day supply? (This applies to ALL medications) Patient does not have SCP

## 2024-12-01 RX ORDER — BUPROPION HYDROCHLORIDE 150 MG/1
150 TABLET ORAL EVERY MORNING
Qty: 90 TABLET | Refills: 1 | Status: SHIPPED | OUTPATIENT
Start: 2024-12-01

## 2024-12-01 RX ORDER — ESCITALOPRAM OXALATE 10 MG/1
10 TABLET ORAL DAILY
Qty: 90 TABLET | Refills: 1 | Status: SHIPPED | OUTPATIENT
Start: 2024-12-01

## 2025-03-31 ENCOUNTER — OFFICE VISIT (OUTPATIENT)
Dept: URGENT CARE | Facility: CLINIC | Age: 25
End: 2025-03-31
Payer: COMMERCIAL

## 2025-03-31 VITALS
TEMPERATURE: 97.4 F | OXYGEN SATURATION: 96 % | HEIGHT: 70 IN | WEIGHT: 293 LBS | HEART RATE: 94 BPM | DIASTOLIC BLOOD PRESSURE: 92 MMHG | SYSTOLIC BLOOD PRESSURE: 154 MMHG | RESPIRATION RATE: 20 BRPM | BODY MASS INDEX: 41.95 KG/M2

## 2025-03-31 DIAGNOSIS — J01.00 ACUTE NON-RECURRENT MAXILLARY SINUSITIS: ICD-10-CM

## 2025-03-31 DIAGNOSIS — J98.01 BRONCHOSPASM: ICD-10-CM

## 2025-03-31 DIAGNOSIS — I10 HYPERTENSION, UNSPECIFIED TYPE: ICD-10-CM

## 2025-03-31 PROCEDURE — 99213 OFFICE O/P EST LOW 20 MIN: CPT | Performed by: PHYSICIAN ASSISTANT

## 2025-03-31 PROCEDURE — 3080F DIAST BP >= 90 MM HG: CPT | Performed by: PHYSICIAN ASSISTANT

## 2025-03-31 PROCEDURE — 3077F SYST BP >= 140 MM HG: CPT | Performed by: PHYSICIAN ASSISTANT

## 2025-03-31 RX ORDER — DEXTROMETHORPHAN HYDROBROMIDE AND PROMETHAZINE HYDROCHLORIDE 15; 6.25 MG/5ML; MG/5ML
5 SYRUP ORAL EVERY 4 HOURS PRN
Qty: 120 ML | Refills: 0 | Status: SHIPPED | OUTPATIENT
Start: 2025-03-31

## 2025-03-31 RX ORDER — METHYLPREDNISOLONE 4 MG/1
TABLET ORAL
Qty: 21 TABLET | Refills: 0 | Status: SHIPPED | OUTPATIENT
Start: 2025-03-31

## 2025-03-31 RX ORDER — BENZONATATE 200 MG/1
200 CAPSULE ORAL 3 TIMES DAILY PRN
Qty: 60 CAPSULE | Refills: 0 | Status: SHIPPED | OUTPATIENT
Start: 2025-03-31

## 2025-03-31 ASSESSMENT — ENCOUNTER SYMPTOMS
FEVER: 0
SPUTUM PRODUCTION: 0
COUGH: 1
SHORTNESS OF BREATH: 0
DIARRHEA: 0
NAUSEA: 0
ABDOMINAL PAIN: 0
CHILLS: 1
WHEEZING: 0
VOMITING: 1

## 2025-03-31 NOTE — PROGRESS NOTES
"Subjective:   Rebecca Garland  is a 25 y.o. female who presents for Cough (Phlegmy,fatigue, chills, vomiting/nausea, congestion, x2 weeks. OTC musinex.)      Cough  This is a new problem. The current episode started 1 to 4 weeks ago. Associated symptoms include chills. Pertinent negatives include no fever, rash, shortness of breath or wheezing.   Patient presents urgent care describing last 2 weeks of symptoms of respiratory illness with spastic coughing.  Patient's had nasal congestion and sinus pressure as well.  Notes some sinus pressure pain.  Patient's had posttussive emesis secondary to spastic coughing.  Patient complains of fatigue.  Patient's had some chills and hot and cold flashes.  Patient did have a single episode of vomiting outside of coughing.  Denies diarrhea.  Denies history of asthma or pneumonia.  Patient's had COVID in the past.    Review of Systems   Constitutional:  Positive for chills and malaise/fatigue. Negative for fever.   HENT:  Positive for congestion.    Respiratory:  Positive for cough. Negative for sputum production, shortness of breath and wheezing.    Gastrointestinal:  Positive for vomiting. Negative for abdominal pain, diarrhea and nausea.   Skin:  Negative for rash.       No Known Allergies     Objective:   BP (!) 154/92 (BP Location: Right arm, Patient Position: Sitting, BP Cuff Size: Large adult)   Pulse 94   Temp 36.3 °C (97.4 °F) (Temporal)   Resp 20   Ht 1.778 m (5' 10\")   Wt (!) 160 kg (353 lb 3.2 oz)   SpO2 96%   BMI 50.68 kg/m²     Physical Exam  Vitals and nursing note reviewed.   Constitutional:       General: She is not in acute distress.     Appearance: She is well-developed. She is not diaphoretic.   HENT:      Head: Normocephalic and atraumatic.      Right Ear: Tympanic membrane, ear canal and external ear normal.      Left Ear: Tympanic membrane, ear canal and external ear normal.      Nose: Nose normal.      Mouth/Throat:      Mouth: Mucous membranes " are moist.      Pharynx: Uvula midline. Posterior oropharyngeal erythema ( mild PND) present. No oropharyngeal exudate.      Tonsils: No tonsillar abscesses.   Eyes:      General: Lids are normal. No scleral icterus.        Right eye: No discharge.         Left eye: No discharge.      Conjunctiva/sclera: Conjunctivae normal.   Pulmonary:      Effort: Pulmonary effort is normal. No respiratory distress.      Breath sounds: Normal breath sounds. No stridor. No decreased breath sounds, wheezing, rhonchi or rales.   Musculoskeletal:         General: Normal range of motion.      Cervical back: Neck supple.   Skin:     General: Skin is warm and dry.      Coloration: Skin is not pale.      Findings: No erythema.   Neurological:      Mental Status: She is alert and oriented to person, place, and time. She is not disoriented.   Psychiatric:         Speech: Speech normal.         Behavior: Behavior normal.         Assessment/Plan:   1. Acute non-recurrent maxillary sinusitis  - methylPREDNISolone (MEDROL DOSEPAK) 4 MG Tablet Therapy Pack; Follow schedule on package instructions.  Dispense: 21 Tablet; Refill: 0  - amoxicillin-clavulanate (AUGMENTIN) 875-125 MG Tab; Take 1 Tablet by mouth 2 times a day for 7 days.  Dispense: 14 Tablet; Refill: 0  - benzonatate (TESSALON) 200 MG capsule; Take 1 Capsule by mouth 3 times a day as needed for Cough.  Dispense: 60 Capsule; Refill: 0  - promethazine-dextromethorphan (PROMETHAZINE-DM) 6.25-15 MG/5ML syrup; Take 5 mL by mouth every four hours as needed for Cough.  Dispense: 120 mL; Refill: 0  - Referral to establish with PCP    2. Bronchospasm  - methylPREDNISolone (MEDROL DOSEPAK) 4 MG Tablet Therapy Pack; Follow schedule on package instructions.  Dispense: 21 Tablet; Refill: 0    3. Hypertension, unspecified type  Supportive care is reviewed with patient/caregiver - recommend to push PO fluids and electrolytes,     I have worn an N95 mask, gloves and eye protection for the entire  encounter with this patient.     Differential diagnosis, natural history, supportive care, and indications for immediate follow-up discussed.

## 2025-03-31 NOTE — LETTER
[facility]  [address] [place] [address]     March 31, 2025    Patient: Rebecca Garland   YOB: 2000   Date of Visit: 3/31/2025       To Whom It May Concern:    Rebecca Garland was seen and treated in our department on 3/31/2025.  She should be excused from missed work for today.    Sincerely,     Arden Ansari P.A.-C.

## 2025-04-25 DIAGNOSIS — F41.9 ANXIETY: ICD-10-CM

## 2025-04-25 RX ORDER — PROPRANOLOL HCL 20 MG
20 TABLET ORAL 3 TIMES DAILY PRN
Qty: 270 TABLET | Refills: 1 | Status: SHIPPED | OUTPATIENT
Start: 2025-04-25

## 2025-06-16 ENCOUNTER — APPOINTMENT (OUTPATIENT)
Dept: MEDICAL GROUP | Facility: LAB | Age: 25
End: 2025-06-16
Payer: COMMERCIAL